# Patient Record
Sex: MALE | Employment: FULL TIME | URBAN - METROPOLITAN AREA
[De-identification: names, ages, dates, MRNs, and addresses within clinical notes are randomized per-mention and may not be internally consistent; named-entity substitution may affect disease eponyms.]

---

## 2024-06-03 ENCOUNTER — TELEPHONE (OUTPATIENT)
Age: 24
End: 2024-06-03

## 2024-10-15 ENCOUNTER — HOSPITAL ENCOUNTER (EMERGENCY)
Facility: HOSPITAL | Age: 24
Discharge: HOME | End: 2024-10-15
Attending: EMERGENCY MEDICINE

## 2024-10-15 VITALS
WEIGHT: 132.8 LBS | BODY MASS INDEX: 20.84 KG/M2 | HEIGHT: 67 IN | SYSTOLIC BLOOD PRESSURE: 116 MMHG | HEART RATE: 75 BPM | DIASTOLIC BLOOD PRESSURE: 66 MMHG | OXYGEN SATURATION: 99 % | RESPIRATION RATE: 18 BRPM | TEMPERATURE: 98 F

## 2024-10-15 DIAGNOSIS — L50.9 URTICARIA: Primary | ICD-10-CM

## 2024-10-15 PROCEDURE — 99284 EMERGENCY DEPT VISIT MOD MDM: CPT

## 2024-10-15 PROCEDURE — 99283 EMERGENCY DEPT VISIT LOW MDM: CPT

## 2024-10-15 RX ORDER — DIPHENHYDRAMINE HCL 25 MG
25 TABLET ORAL EVERY 6 HOURS PRN
Qty: 30 TABLET | Refills: 0 | Status: SHIPPED | OUTPATIENT
Start: 2024-10-15

## 2024-10-15 RX ORDER — PREDNISONE 20 MG/1
40 TABLET ORAL ONCE
Status: COMPLETED | OUTPATIENT
Start: 2024-10-15 | End: 2024-10-15

## 2024-10-15 RX ORDER — FAMOTIDINE 20 MG/1
20 TABLET, FILM COATED ORAL ONCE
Status: COMPLETED | OUTPATIENT
Start: 2024-10-15 | End: 2024-10-15

## 2024-10-15 RX ORDER — DIPHENHYDRAMINE HCL 25 MG
25 TABLET ORAL ONCE
Status: COMPLETED | OUTPATIENT
Start: 2024-10-15 | End: 2024-10-15

## 2024-10-15 RX ORDER — PREDNISONE 20 MG/1
40 TABLET ORAL DAILY
Qty: 8 TABLET | Refills: 0 | Status: SHIPPED | OUTPATIENT
Start: 2024-10-15 | End: 2024-10-19

## 2024-10-15 RX ADMIN — PREDNISONE 40 MG: 20 TABLET ORAL at 20:38

## 2024-10-15 RX ADMIN — FAMOTIDINE 20 MG: 20 TABLET, FILM COATED ORAL at 20:38

## 2024-10-15 RX ADMIN — DIPHENHYDRAMINE HYDROCHLORIDE 25 MG: 25 TABLET ORAL at 20:38

## 2024-10-15 NOTE — Clinical Note
Harjit Lopez was seen and treated in our emergency department on 10/15/2024.                Diagnosis:     Harjit  may return to work on return date.    He may return on this date: 10/16/2024         If you have any questions or concerns, please don't hesitate to call.      Shira Mcnair PA-C    ______________________________           _______________          _______________  Hospital Representative                              Date                                Time

## 2024-10-16 NOTE — ED PROVIDER NOTES
Time reflects when diagnosis was documented in both MDM as applicable and the Disposition within this note       Time User Action Codes Description Comment    10/15/2024  8:33 PM Shira Mcnair Add [L50.9] Urticaria           ED Disposition       ED Disposition   Discharge    Condition   Stable    Date/Time   Tue Oct 15, 2024  8:33 PM    Comment   Harjit Lopez discharge to home/self care.                   Assessment & Plan       Medical Decision Making  Patient with no known allergies present for a rash that developed after eating queso this morning.  He is hemodynamically stable and in no acute distress.  Airway is patent.  Differential diagnosis includes but not limited to acute urticaria, allergic reaction, anaphylaxis, allergic dermatitis, contact dermatitis, or poison ivy/oak/sumac.  Examination revealed an urticarial rash.  No associated symptoms or physical exam findings concerning for anaphylaxis.  No indication for epinephrine at this time.  Will treat with a prednisone burst and benadryl.  He is in agreement with the treatment plan and all questions were answered.  Strict return precautions discussed with the patient and he verbalized understanding.  Follow-up with PCP, but return to the ED in the interim with new or worsening symptoms.    Problems Addressed:  Urticaria: acute illness or injury    Risk  OTC drugs.  Prescription drug management.             Medications   predniSONE tablet 40 mg (40 mg Oral Given 10/15/24 2038)   diphenhydrAMINE (BENADRYL) tablet 25 mg (25 mg Oral Given 10/15/24 2038)   famotidine (PEPCID) tablet 20 mg (20 mg Oral Given 10/15/24 2038)       ED Risk Strat Scores             SBIRT 22yo+      Flowsheet Row Most Recent Value   Initial Alcohol Screen: US AUDIT-C     1. How often do you have a drink containing alcohol? 0 Filed at: 10/15/2024 1954   2. How many drinks containing alcohol do you have on a typical day you are drinking?  0 Filed at: 10/15/2024 1954   3a. Male  UNDER 65: How often do you have five or more drinks on one occasion? 0 Filed at: 10/15/2024 1954   Audit-C Score 0 Filed at: 10/15/2024 1954   KISHA: How many times in the past year have you...    Used an illegal drug or used a prescription medication for non-medical reasons? Never Filed at: 10/15/2024 1954              History of Present Illness       Chief Complaint   Patient presents with    Allergic Reaction     Arrives reporting he thinks he is having an allergic reaction to queso he had this morning. Describes a lump to right hip. No meds PTA. No known allergies.        History reviewed. No pertinent past medical history.   History reviewed. No pertinent surgical history.   History reviewed. No pertinent family history.   Social History     Tobacco Use    Smoking status: Never    Smokeless tobacco: Never   Substance Use Topics    Alcohol use: Not Currently    Drug use: Not Currently      E-Cigarette/Vaping      E-Cigarette/Vaping Substances    Nicotine Yes     THC No     CBD No     Flavoring Yes       I have reviewed and agree with the history as documented.     The patient is a 24-year-old male with no pertinent past medical history, who presents for a rash.  He reports eating queso this morning and shortly afterward he developed generalized itching.  Later in the day he noticed large bumps on his right side and behind his left shoulder.  No associated difficulty swallowing, throat irritation, shortness of breath, nausea, vomiting, diarrhea, myalgias, recent URI symptoms, or F/C.  He denies any other new foods, soaps, lotions, or laundry detergents.  No medications taken PTA.      History provided by:  Patient   used: Yes (Projectioneering  Farooq)        Review of Systems   Constitutional:  Negative for chills and fever.   HENT:  Negative for congestion, ear pain, rhinorrhea, sneezing, sore throat, trouble swallowing and voice change.    Eyes:  Negative for pain and visual  disturbance.   Respiratory:  Negative for cough, chest tightness, shortness of breath, wheezing and stridor.    Cardiovascular:  Negative for chest pain and palpitations.   Gastrointestinal:  Negative for abdominal pain, diarrhea, nausea and vomiting.   Genitourinary:  Negative for dysuria and hematuria.   Musculoskeletal:  Negative for arthralgias, back pain and myalgias.   Skin:  Positive for rash. Negative for color change.   Neurological:  Negative for seizures, syncope, light-headedness and headaches.   All other systems reviewed and are negative.      Objective       ED Triage Vitals [10/15/24 1950]   Temperature Pulse Blood Pressure Respirations SpO2 Patient Position - Orthostatic VS   98 °F (36.7 °C) 75 116/66 18 99 % Lying      Temp Source Heart Rate Source BP Location FiO2 (%) Pain Score    Oral Monitor Right arm -- --      Vitals      Date and Time Temp Pulse SpO2 Resp BP Pain Score FACES Pain Rating User   10/15/24 1950 98 °F (36.7 °C) 75 99 % 18 116/66 -- -- DM            Physical Exam  Vitals and nursing note reviewed.   Constitutional:       General: He is awake. He is not in acute distress.     Appearance: Normal appearance. He is well-developed and normal weight. He is not toxic-appearing or diaphoretic.   HENT:      Head: Normocephalic and atraumatic.      Right Ear: External ear normal.      Left Ear: External ear normal.      Nose: Nose normal.      Mouth/Throat:      Lips: Pink.      Mouth: Mucous membranes are moist. No oral lesions or angioedema.      Pharynx: Oropharynx is clear. Uvula midline. No pharyngeal swelling, oropharyngeal exudate, posterior oropharyngeal erythema or uvula swelling.      Tonsils: No tonsillar exudate.      Comments: Patient is tolerating his secretions without difficulty.  Airway is patent.  No swelling of the lips, tonsils, or tongue.  No intraoral rash.  Eyes:      General: Lids are normal. Vision grossly intact. Gaze aligned appropriately.      Conjunctiva/sclera:  Conjunctivae normal.      Right eye: Right conjunctiva is not injected.      Left eye: Left conjunctiva is not injected.      Pupils: Pupils are equal, round, and reactive to light.   Cardiovascular:      Rate and Rhythm: Normal rate and regular rhythm.      Heart sounds: S1 normal and S2 normal. No murmur heard.     No friction rub. No gallop.   Pulmonary:      Effort: Pulmonary effort is normal. No tachypnea, accessory muscle usage, respiratory distress or retractions.      Breath sounds: Normal breath sounds and air entry. No stridor or transmitted upper airway sounds. No wheezing, rhonchi or rales.   Abdominal:      General: Abdomen is flat.      Palpations: Abdomen is soft.      Tenderness: There is no abdominal tenderness. There is no guarding or rebound.   Musculoskeletal:      Cervical back: Normal, full passive range of motion without pain and neck supple. No rigidity or crepitus. No spinous process tenderness or muscular tenderness.      Thoracic back: Normal.      Lumbar back: Normal.   Lymphadenopathy:      Cervical: No cervical adenopathy.   Skin:     General: Skin is warm.      Capillary Refill: Capillary refill takes less than 2 seconds.      Coloration: Skin is not pale.      Findings: Rash present. No erythema, lesion or petechiae. Rash is urticarial.      Comments: Small urticaria noted on the right flank and left posterior shoulder.   Neurological:      Mental Status: He is alert and oriented to person, place, and time.   Psychiatric:         Attention and Perception: Attention normal.         Mood and Affect: Mood normal.         Speech: Speech normal.         Behavior: Behavior normal. Behavior is cooperative.         Results Reviewed       None            No orders to display       Procedures      ED Medication and Procedure Management   None     Discharge Medication List as of 10/15/2024  8:35 PM        START taking these medications    Details   diphenhydrAMINE (BENADRYL) 25 mg tablet Take 1  tablet (25 mg total) by mouth every 6 (six) hours as needed for itching or allergies, Starting Tue 10/15/2024, Normal      predniSONE 20 mg tablet Take 2 tablets (40 mg total) by mouth daily for 4 days, Starting Tue 10/15/2024, Until Sat 10/19/2024, Normal           No discharge procedures on file.  ED SEPSIS DOCUMENTATION   Time reflects when diagnosis was documented in both MDM as applicable and the Disposition within this note       Time User Action Codes Description Comment    10/15/2024  8:33 PM Shira Mcnair Add [L50.9] Urticaria                  Shira Mcnair PA-C  10/16/24 0115